# Patient Record
Sex: FEMALE | Race: WHITE | Employment: STUDENT | URBAN - METROPOLITAN AREA
[De-identification: names, ages, dates, MRNs, and addresses within clinical notes are randomized per-mention and may not be internally consistent; named-entity substitution may affect disease eponyms.]

---
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
## 2021-04-12 ENCOUNTER — IMMUNIZATIONS (OUTPATIENT)
Dept: FAMILY MEDICINE CLINIC | Facility: HOSPITAL | Age: 21
End: 2021-04-12

## 2021-04-12 PROCEDURE — 91303: CPT

## 2021-04-12 PROCEDURE — 0031A: CPT

## 2021-04-13 ENCOUNTER — HOSPITAL ENCOUNTER (EMERGENCY)
Facility: HOSPITAL | Age: 21
Discharge: HOME | End: 2021-04-13
Payer: COMMERCIAL

## 2021-04-13 ENCOUNTER — APPOINTMENT (EMERGENCY)
Dept: CT IMAGING | Facility: HOSPITAL | Age: 21
End: 2021-04-13
Payer: COMMERCIAL

## 2021-04-13 VITALS
WEIGHT: 193.2 LBS | RESPIRATION RATE: 18 BRPM | TEMPERATURE: 98.3 F | HEIGHT: 65 IN | HEART RATE: 95 BPM | OXYGEN SATURATION: 100 % | DIASTOLIC BLOOD PRESSURE: 46 MMHG | BODY MASS INDEX: 32.19 KG/M2 | SYSTOLIC BLOOD PRESSURE: 104 MMHG

## 2021-04-13 DIAGNOSIS — R07.81 PLEURITIC CHEST PAIN: Primary | ICD-10-CM

## 2021-04-13 LAB
ALBUMIN SERPL BCP-MCNC: 4.2 G/DL (ref 3.4–4.8)
ALP SERPL-CCNC: 44.4 U/L (ref 35–140)
ALT SERPL W P-5'-P-CCNC: 16 U/L (ref 5–54)
ANION GAP SERPL CALCULATED.3IONS-SCNC: 8 MMOL/L (ref 4–13)
AST SERPL W P-5'-P-CCNC: 17 U/L (ref 15–41)
BASOPHILS # BLD AUTO: 0.01 THOUSANDS/ΜL (ref 0–0.1)
BASOPHILS NFR BLD AUTO: 0 % (ref 0–1)
BILIRUB SERPL-MCNC: 0.31 MG/DL (ref 0.3–1.2)
BUN SERPL-MCNC: 9 MG/DL (ref 6–20)
CALCIUM SERPL-MCNC: 9.3 MG/DL (ref 8.4–10.2)
CHLORIDE SERPL-SCNC: 104 MMOL/L (ref 96–108)
CO2 SERPL-SCNC: 25 MMOL/L (ref 22–33)
CREAT SERPL-MCNC: 0.84 MG/DL (ref 0.4–1.1)
D DIMER PPP FEU-MCNC: 0.95 MG/L FEU (ref 0.19–0.49)
EOSINOPHIL # BLD AUTO: 0.02 THOUSAND/ΜL (ref 0–0.61)
EOSINOPHIL NFR BLD AUTO: 1 % (ref 0–6)
ERYTHROCYTE [DISTWIDTH] IN BLOOD BY AUTOMATED COUNT: 11.9 % (ref 11.6–15.1)
EXT PREG TEST URINE: NEGATIVE
EXT. CONTROL ED NAV: NORMAL
GFR SERPL CREATININE-BSD FRML MDRD: 100 ML/MIN/1.73SQ M
GLUCOSE SERPL-MCNC: 87 MG/DL (ref 65–140)
HCT VFR BLD AUTO: 39.7 % (ref 34.8–46.1)
HGB BLD-MCNC: 13.1 G/DL (ref 11.5–15.4)
IMM GRANULOCYTES # BLD AUTO: 0 THOUSAND/UL (ref 0–0.2)
IMM GRANULOCYTES NFR BLD AUTO: 0 % (ref 0–2)
LYMPHOCYTES # BLD AUTO: 1.33 THOUSANDS/ΜL (ref 0.6–4.47)
LYMPHOCYTES NFR BLD AUTO: 41 % (ref 14–44)
MCH RBC QN AUTO: 29.4 PG (ref 26.8–34.3)
MCHC RBC AUTO-ENTMCNC: 33 G/DL (ref 31.4–37.4)
MCV RBC AUTO: 89 FL (ref 82–98)
MONOCYTES # BLD AUTO: 0.82 THOUSAND/ΜL (ref 0.17–1.22)
MONOCYTES NFR BLD AUTO: 26 % (ref 4–12)
NEUTROPHILS # BLD AUTO: 1.01 THOUSANDS/ΜL (ref 1.85–7.62)
NEUTS SEG NFR BLD AUTO: 32 % (ref 43–75)
PLATELET # BLD AUTO: 287 THOUSANDS/UL (ref 149–390)
PMV BLD AUTO: 10.1 FL (ref 8.9–12.7)
POTASSIUM SERPL-SCNC: 3.7 MMOL/L (ref 3.5–5)
PROT SERPL-MCNC: 8 G/DL (ref 6.4–8.3)
RBC # BLD AUTO: 4.45 MILLION/UL (ref 3.81–5.12)
SODIUM SERPL-SCNC: 137 MMOL/L (ref 133–145)
TROPONIN I SERPL-MCNC: <0.03 NG/ML (ref 0–0.07)
WBC # BLD AUTO: 3.19 THOUSAND/UL (ref 4.31–10.16)

## 2021-04-13 PROCEDURE — 85379 FIBRIN DEGRADATION QUANT: CPT | Performed by: PHYSICIAN ASSISTANT

## 2021-04-13 PROCEDURE — G1004 CDSM NDSC: HCPCS

## 2021-04-13 PROCEDURE — 99285 EMERGENCY DEPT VISIT HI MDM: CPT | Performed by: PHYSICIAN ASSISTANT

## 2021-04-13 PROCEDURE — 99285 EMERGENCY DEPT VISIT HI MDM: CPT

## 2021-04-13 PROCEDURE — 81025 URINE PREGNANCY TEST: CPT | Performed by: PHYSICIAN ASSISTANT

## 2021-04-13 PROCEDURE — 80053 COMPREHEN METABOLIC PANEL: CPT | Performed by: PHYSICIAN ASSISTANT

## 2021-04-13 PROCEDURE — 85025 COMPLETE CBC W/AUTO DIFF WBC: CPT | Performed by: PHYSICIAN ASSISTANT

## 2021-04-13 PROCEDURE — 93005 ELECTROCARDIOGRAM TRACING: CPT

## 2021-04-13 PROCEDURE — 84484 ASSAY OF TROPONIN QUANT: CPT | Performed by: PHYSICIAN ASSISTANT

## 2021-04-13 PROCEDURE — 36415 COLL VENOUS BLD VENIPUNCTURE: CPT | Performed by: PHYSICIAN ASSISTANT

## 2021-04-13 PROCEDURE — 71275 CT ANGIOGRAPHY CHEST: CPT

## 2021-04-13 RX ADMIN — IOHEXOL 85 ML: 350 INJECTION, SOLUTION INTRAVENOUS at 18:10

## 2021-04-13 NOTE — ED PROVIDER NOTES
History  Chief Complaint   Patient presents with    Chest Pain     Pt c/o of sharp chest pain started today  Pt states its worse with deep breaths  Pt was vaccinated yesterday with J&J covid vaccine  65-year-old female comes in today from [facility] for evaluation of pleuritic chest pain that began today  She had the [address] vaccine yesterday  She reports that her chest hurts when she of the she is on oral contraceptive pills  Denies any recent travel  She does not smoke  She denies feeling short of breath  She has been having a slight intermittent cough occasionally  Prior to Admission Medications   Prescriptions Last Dose Informant Patient Reported? Taking? APRI 0 15-30 MG-MCG per tablet   Yes No   Sig: Take 1 tablet by mouth daily      Facility-Administered Medications: None       No past medical history on file  Past Surgical History:   Procedure Laterality Date    WISDOM TOOTH EXTRACTION         Family History   Problem Relation Age of Onset    No Known Problems Mother     No Known Problems Father      I have reviewed and agree with the history as documented  E-Cigarette/Vaping     E-Cigarette/Vaping Substances     Social History     Tobacco Use    Smoking status: Never Smoker    Smokeless tobacco: Never Used   Substance Use Topics    Alcohol use: Yes     Frequency: 2-4 times a month    Drug use: No       Review of Systems   Constitutional: Negative for fever  HENT: Negative for nosebleeds  Eyes: Negative for redness  Respiratory: Positive for chest tightness  Negative for shortness of breath  Cardiovascular: Positive for chest pain  Gastrointestinal: Negative for blood in stool  Genitourinary: Negative for hematuria  Musculoskeletal: Negative for gait problem  Skin: Negative for rash  Neurological: Negative for seizures  Psychiatric/Behavioral: Negative for behavioral problems         Physical Exam  Physical Exam  Constitutional: General: She is not in acute distress  Appearance: Normal appearance  She is normal weight  HENT:      Head: Normocephalic and atraumatic  Nose: No rhinorrhea  Mouth/Throat:      Mouth: Mucous membranes are moist    Eyes:      Extraocular Movements: Extraocular movements intact  Neck:      Musculoskeletal: Normal range of motion  Cardiovascular:      Rate and Rhythm: Normal rate and regular rhythm  Pulmonary:      Effort: Pulmonary effort is normal  No tachypnea or respiratory distress  Musculoskeletal: Normal range of motion  Skin:     General: Skin is warm and dry  Neurological:      General: No focal deficit present  Mental Status: She is alert     Psychiatric:         Mood and Affect: Mood normal          Behavior: Behavior normal          Vital Signs  ED Triage Vitals   Temperature Pulse Respirations Blood Pressure SpO2   04/13/21 1650 04/13/21 1645 04/13/21 1645 04/13/21 1646 04/13/21 1645   98 3 °F (36 8 °C) 95 20 124/53 100 %      Temp Source Heart Rate Source Patient Position - Orthostatic VS BP Location FiO2 (%)   04/13/21 1650 04/13/21 1645 -- -- --   Oral Monitor         Pain Score       04/13/21 1645       3           Vitals:    04/13/21 1645 04/13/21 1646 04/13/21 1833   BP:  124/53 (!) 104/46   Pulse: 95  95         Visual Acuity      ED Medications  Medications   iohexol (OMNIPAQUE) 350 MG/ML injection (SINGLE-DOSE) 85 mL (85 mL Intravenous Given 4/13/21 1810)       Diagnostic Studies  Results Reviewed     Procedure Component Value Units Date/Time    POCT pregnancy, urine [[id number]]  (Normal) Resulted: 04/13/21 1754    Lab Status: Final result Updated: 04/13/21 1755     EXT PREG TEST UR (Ref: Negative) negative     Control valid    Comprehensive metabolic panel [[id number]] Collected: 04/13/21 1700    Lab Status: Final result Specimen: Blood from Arm, Right Updated: 04/13/21 1736     Sodium 137 mmol/L      Potassium 3 7 mmol/L      Chloride 104 mmol/L      CO2 25 mmol/L      ANION GAP 8 mmol/L      BUN 9 mg/dL      Creatinine 0 84 mg/dL      Glucose 87 mg/dL      Calcium 9 3 mg/dL      AST 17 U/L      ALT 16 U/L      Alkaline Phosphatase 44 4 U/L      Total Protein 8 0 g/dL      Albumin 4 2 g/dL      Total Bilirubin 0 31 mg/dL      eGFR 100 ml/min/1 73sq m     Narrative:      National Kidney Disease Foundation guidelines for Chronic Kidney Disease (CKD):     Stage 1 with normal or high GFR (GFR > 90 mL/min/1 73 square meters)    Stage 2 Mild CKD (GFR = 60-89 mL/min/1 73 square meters)    Stage 3A Moderate CKD (GFR = 45-59 mL/min/1 73 square meters)    Stage 3B Moderate CKD (GFR = 30-44 mL/min/1 73 square meters)    Stage 4 Severe CKD (GFR = 15-29 mL/min/1 73 square meters)    Stage 5 End Stage CKD (GFR <15 mL/min/1 73 square meters)  Note: GFR calculation is accurate only with a steady state creatinine    Troponin I [[id number]]  (Normal) Collected: 04/13/21 1700    Lab Status: Final result Specimen: Blood from Arm, Right Updated: 04/13/21 1726     Troponin I <0 03 ng/mL     D-Dimer [[id number]]  (Abnormal) Collected: 04/13/21 1700    Lab Status: Final result Specimen: Blood from Arm, Right Updated: 04/13/21 1720     D-Dimer, Quant  0 95 mg/L FEU     CBC and differential [[id number]]  (Abnormal) Collected: 04/13/21 1700    Lab Status: Final result Specimen: Blood from Arm, Right Updated: 04/13/21 1707     WBC 3 19 Thousand/uL      RBC 4 45 Million/uL      Hemoglobin 13 1 g/dL      Hematocrit 39 7 %      MCV 89 fL      MCH 29 4 pg      MCHC 33 0 g/dL      RDW 11 9 %      MPV 10 1 fL      Platelets 425 Thousands/uL      Neutrophils Relative 32 %      Immat GRANS % 0 %      Lymphocytes Relative 41 %      Monocytes Relative 26 %      Eosinophils Relative 1 %      Basophils Relative 0 %      Neutrophils Absolute 1 01 Thousands/µL      Immature Grans Absolute 0 00 Thousand/uL      Lymphocytes Absolute 1 33 Thousands/µL      Monocytes Absolute 0 82 Thousand/µL      Eosinophils Absolute 0 02 Thousand/µL      Basophils Absolute 0 01 Thousands/µL                  CTA ED chest PE study   Final Result by Roz Negrete MD (04/13 1836)      No evidence of lung opacity  No evidence of pulmonary embolism  Workstation performed: [id number]                    Procedures  Procedures         ED Course  ED Course as of Apr 13 1845 Tue Apr 13, 2021   1647 EKG performed at 4:47 p m  Interpreted by me shows normal sinus rhythm with a rate of 99, normal axis, no ectopy, no significant ST elevations                                    Wells' Criteria for PE      Most Recent Value   Wells' Criteria for PE   Clinical signs and symptoms of DVT  0 Filed at: 04/13/2021 1737   PE is primary diagnosis or equally likely  0 Filed at: 04/13/2021 1737   HR >100  0 Filed at: 04/13/2021 1737   Immobilization at least 3 days or Surgery in the previous 4 weeks  0 Filed at: 04/13/2021 1737   Previous, objectively diagnosed PE or DVT  0 Filed at: 04/13/2021 1737   Hemoptysis  0 Filed at: 04/13/2021 1737   Malignancy with treatment within 6 months or palliative  0 Filed at: 04/13/2021 1737   Wells' Criteria Total  0 Filed at: 04/13/2021 1737                MDM  Number of Diagnoses or Management Options  Pleuritic chest pain:   Diagnosis management comments: 48-year-old female who came in with pleuritic chest pain after getting the [employer] vaccine yesterday  Her D-dimer was elevated, however her CTA was negative for PE  Advised NSAIDs and follow-up with primary care physician        Disposition  Final diagnoses:   Pleuritic chest pain     Time reflects when diagnosis was documented in both MDM as applicable and the Disposition within this note     Time User Action Codes Description Comment    4/13/2021  6:44 PM Matt Aguero Add [R07 81] Pleuritic chest pain       ED Disposition     ED Disposition Condition Date/Time Comment    Discharge Stable Tue Apr 13, 2021  6:44 PM Kwaku Chapa discharge to home/self care  Follow-up Information     Follow up With Specialties Details Why Contact Info    Dean Ochoa MD Emergency Medicine Schedule an appointment as soon as possible for a visit   [address] [place] [id number]  [phone]            Patient's Medications   Discharge Prescriptions    No medications on file     No discharge procedures on file      PDMP Review     None          ED Provider  Electronically Signed by           Higinio Upton PA-C  04/13/21 9773

## 2021-04-13 NOTE — DISCHARGE INSTRUCTIONS
Return to the emergency department for any new worsening or concerning symptoms within the next 1-2 days  Please call your primary care physician to make a follow-up appointment within the next 1-2 business days  If you do not have 1, the name of 1 has been provided to you  You can also try calling the [employer] phone number to get a new physician

## 2021-04-15 LAB
ATRIAL RATE: 99 BPM
P AXIS: 56 DEGREES
PR INTERVAL: 132 MS
QRS AXIS: 27 DEGREES
QRSD INTERVAL: 88 MS
QT INTERVAL: 338 MS
QTC INTERVAL: 434 MS
T WAVE AXIS: 2 DEGREES
VENTRICULAR RATE: 99 BPM

## 2021-04-15 PROCEDURE — 93010 ELECTROCARDIOGRAM REPORT: CPT | Performed by: INTERNAL MEDICINE
